# Patient Record
Sex: FEMALE | Race: WHITE | NOT HISPANIC OR LATINO | URBAN - METROPOLITAN AREA
[De-identification: names, ages, dates, MRNs, and addresses within clinical notes are randomized per-mention and may not be internally consistent; named-entity substitution may affect disease eponyms.]

---

## 2020-08-11 ENCOUNTER — LAB (OUTPATIENT)
Dept: LAB | Facility: HOSPITAL | Age: 30
End: 2020-08-11

## 2020-08-11 ENCOUNTER — OFFICE VISIT (OUTPATIENT)
Dept: OBSTETRICS AND GYNECOLOGY | Facility: CLINIC | Age: 30
End: 2020-08-11

## 2020-08-11 VITALS
DIASTOLIC BLOOD PRESSURE: 70 MMHG | HEIGHT: 64 IN | BODY MASS INDEX: 31.07 KG/M2 | WEIGHT: 182 LBS | SYSTOLIC BLOOD PRESSURE: 118 MMHG

## 2020-08-11 DIAGNOSIS — Z01.411 ENCOUNTER FOR GYNECOLOGICAL EXAMINATION WITH ABNORMAL FINDING: ICD-10-CM

## 2020-08-11 DIAGNOSIS — N92.6 IRREGULAR MENSES: ICD-10-CM

## 2020-08-11 DIAGNOSIS — N92.6 IRREGULAR MENSES: Primary | ICD-10-CM

## 2020-08-11 LAB — TSH SERPL DL<=0.05 MIU/L-ACNC: 0.97 UIU/ML (ref 0.27–4.2)

## 2020-08-11 PROCEDURE — 99203 OFFICE O/P NEW LOW 30 MIN: CPT | Performed by: OBSTETRICS & GYNECOLOGY

## 2020-08-11 PROCEDURE — 36415 COLL VENOUS BLD VENIPUNCTURE: CPT | Performed by: OBSTETRICS & GYNECOLOGY

## 2020-08-11 PROCEDURE — 84443 ASSAY THYROID STIM HORMONE: CPT | Performed by: OBSTETRICS & GYNECOLOGY

## 2020-08-11 RX ORDER — MEDROXYPROGESTERONE ACETATE 10 MG/1
10 TABLET ORAL DAILY
Qty: 10 TABLET | Refills: 5 | Status: SHIPPED | OUTPATIENT
Start: 2020-08-11

## 2020-08-11 RX ORDER — BUPRENORPHINE HYDROCHLORIDE AND NALOXONE HYDROCHLORIDE DIHYDRATE 8; 2 MG/1; MG/1
1 TABLET SUBLINGUAL DAILY
COMMUNITY

## 2020-08-11 RX ORDER — QUETIAPINE FUMARATE 50 MG/1
50 TABLET, FILM COATED ORAL NIGHTLY
COMMUNITY

## 2020-08-11 NOTE — PROGRESS NOTES
Subjective   Chief Complaint   Patient presents with   • Menstrual Problem     irregular cycles     Jacquelin Quevedo is a 30 y.o. year old  presenting to be seen for her annual exam.    Current birth control method: Tubal / salpingectomy.  These were also done related to ectopic pregnancies.  Her left tube was removed in  and/or segment same thing happened 2019 with her right tube.  This was done in [place].  She reports that there was no follow-up she had to call them.  Ever since that surgery in 2019 she has had irregular cycles.  I would like to get the operative note to make sure no ovarian cysts or ovaries were removed at either surgery.  She does report appears of always been irregular used to feel heavy about 7 days now only about 3 days.  She thought her Pap smear was done about 2 years ago at the time of surgery but I told her I doubted that she would have had any kind of Pap smear done at the time of the emergency ectopic surgery.  She does report one abnormal Pap smear in the past around age 16 her legs been normal since then.  Has pain with intercourse on deeper penetration may be positional no problems in the missionary position.  Some problems with PMS with a week prior having fatigue depression crying really bui and angry occasions for about a week and then it resolves.  Discussed potential dietary changes etc.  She is already on Seroquel and Suboxone.    Patient's last menstrual period was 2020.    She is sexually active.   Condoms are not typically used.    [place] is painful or she is having problems :yes   She has concerns about domestic violence: no.    Cycle Frequency: irregular   Menstrual cycle character: flow is typically normal   Cycle Duration: 3 - 7   Number of heavy days of flows: 2   Intermenstrual bleeding present: no   Post-coital bleeding present: no     She exercises regularly: no.  Self breast awareness:no    Calcium intake: is not adequate.  Caffeine intake:  "caffeine use is moderate-to-high daily  Social History    Tobacco Use      Smoking status: Current Every Day Smoker        Packs/day: 1.00      Smokeless tobacco: Never Used    Social History     Substance and Sexual Activity   Alcohol Use Not Currently        The following portions of the patient's history were reviewed and updated as is  appropriate:She  has a past medical history of Anxiety and Depression.  She does not have a problem list on file.  She  has a past surgical history that includes [place] tooth extraction (2007); Salpingectomy (Left, 2014); and Ectopic pregnancy (Right, 2019).  Her family history is not on file.  She  reports that she has been smoking. She has been smoking about 1.00 pack per day. She has never used smokeless tobacco. She reports that she drank alcohol. She reports that she has current or past drug history.  She is allergic to amoxicillin and penicillins..    Current Outpatient Medications:   •  buprenorphine-naloxone (SUBOXONE) 8-2 MG per SL tablet, Place 1 tablet under the tongue Daily., Disp: , Rfl:   •  QUEtiapine (SEROquel) 50 MG tablet, Take 50 mg by mouth Every Night., Disp: , Rfl:   •  medroxyPROGESTERone (Provera) 10 MG tablet, Take 1 tablet by mouth Daily. Use every 60 days no menses, Disp: 10 tablet, Rfl: 5    Review of systems  Constitutional    POS Skin rashes                            NEG anorexia, fevers, night sweats or weight gain  Breast                POS nothing reported                            NEG persistent breast lump, skin dimpling or nipple discharge  GI                      POS nothing reported                            NEG bloating, change in bowel habits, melena or reflux symptoms                       POS nothing reported                            NEG dysuria or hematuria         Objective   /70   Ht 162.6 cm (64\")   Wt 82.6 kg (182 lb)   LMP 06/16/2020 Comment: B&S  Breastfeeding No   BMI 31.24 kg/m²       General:  well developed; " well nourished  no acute distress  appears stated age   Skin:  No suspicious lesions seen  Lesions on abdomen and lower extremities consistent with insect bites or allergic type of reaction   Thyroid: normal   Breasts:  Not performed.   Abdomen: soft, non-tender; no masses  no umbilical or inguinal hernias are present  no hepato-splenomegaly   Pelvis: Clinical staff was present for exam  External genitalia:  normal appearance of the external genitalia including Bartholin's and [place]'s glands.  :  urethral meatus normal;  Vaginal:  normal pink mucosa without prolapse or lesions.  Cervix:  normal appearance. Pap obtained  Uterus:  normal size, shape and consistency. anteverted;  Adnexa:  normal bimanual exam of the adnexa.  Rectal:  digital rectal exam not performed; anus visually normal appearing.       Lab Review   No data reviewed    Imaging  No data reviewed       Assessment     1. Irregular menses  2. History of 3 ectopic pregnancies bilateral salpingectomy is no follow-up I like to find out if ovarian cyst was removed  3. History of PMS type irritability.  Discussed dietary changes and exercise  4. 1 pack/day smoker encouraged to quit  5. Suboxone maintenance             Plan     1. Check TSH  2. Release of information regarding ectopics  3. 1000 mg calcium in divided doses ideally in diet; regular exercise  4. Self breast awareness discussed  5. Chart cycles Provera 10 mg x 10 days every 60 days as needed  6. Will need to send information regarding PMS and dietary changes (done on Wednesday, August 12)            New Medications Ordered This Visit   Medications   • medroxyPROGESTERone (Provera) 10 MG tablet     Sig: Take 1 tablet by mouth Daily. Use every 60 days no menses     Dispense:  10 tablet     Refill:  5     No orders of the defined types were placed in this encounter.          This note was electronically signed.    Fred Gomez MD  8/11/2020

## 2020-08-12 ENCOUNTER — TELEPHONE (OUTPATIENT)
Dept: OBSTETRICS AND GYNECOLOGY | Facility: CLINIC | Age: 30
End: 2020-08-12

## 2020-08-12 NOTE — TELEPHONE ENCOUNTER
Patient called and stated that she was spotting and wasn't sure if she should start the Provera or not.  I advised her that after speaking with Dr. Gomez that he would like for her to take it for 10 days and see what it does.  Patient verbalized understanding and had no questions at this time.

## 2024-01-26 ENCOUNTER — HOSPITAL ENCOUNTER (EMERGENCY)
Facility: HOSPITAL | Age: 34
Discharge: HOME | End: 2024-01-26
Attending: EMERGENCY MEDICINE
Payer: MEDICAID

## 2024-01-26 ENCOUNTER — APPOINTMENT (OUTPATIENT)
Dept: GENERAL RADIOLOGY | Facility: HOSPITAL | Age: 34
End: 2024-01-26
Payer: MEDICAID

## 2024-01-26 VITALS
SYSTOLIC BLOOD PRESSURE: 132 MMHG | DIASTOLIC BLOOD PRESSURE: 92 MMHG | WEIGHT: 185 LBS | RESPIRATION RATE: 17 BRPM | BODY MASS INDEX: 30.82 KG/M2 | OXYGEN SATURATION: 97 % | HEART RATE: 99 BPM | TEMPERATURE: 98.4 F | HEIGHT: 65 IN

## 2024-01-26 DIAGNOSIS — F17.200 SMOKER: ICD-10-CM

## 2024-01-26 DIAGNOSIS — J20.9 ACUTE BRONCHITIS, UNSPECIFIED ORGANISM: Primary | ICD-10-CM

## 2024-01-26 PROCEDURE — 71046 X-RAY EXAM CHEST 2 VIEWS: CPT

## 2024-01-26 PROCEDURE — 99282 EMERGENCY DEPT VISIT SF MDM: CPT

## 2024-01-26 RX ORDER — ALBUTEROL SULFATE 2.5 MG/3ML
2.5 SOLUTION RESPIRATORY (INHALATION) EVERY 4 HOURS PRN
Qty: 100 ML | Refills: 0 | Status: SHIPPED | OUTPATIENT
Start: 2024-01-26

## 2024-01-26 RX ORDER — DOXYCYCLINE 100 MG/1
100 CAPSULE ORAL 2 TIMES DAILY
Qty: 14 CAPSULE | Refills: 0 | Status: SHIPPED | OUTPATIENT
Start: 2024-01-26 | End: 2024-02-02

## 2024-01-26 RX ORDER — PREDNISONE 50 MG/1
50 TABLET ORAL DAILY
Qty: 5 TABLET | Refills: 0 | Status: SHIPPED | OUTPATIENT
Start: 2024-01-26

## 2024-01-26 NOTE — ED PROVIDER NOTES
[place]    EMERGENCY DEPARTMENT ENCOUNTER      Pt Name: Jacquelin Quevedo  MRN: [id number]  YOB: 1990  Date of evaluation: 1/26/2024  Provider: Suleman Portillo MD    CHIEF COMPLAINT       Chief Complaint   Patient presents with    Shortness of Breath         HISTORY OF PRESENT ILLNESS   Jacquelin Quevedo is a 33 y.o. female who presents to the emergency department    With complaint of persistent cough over the course the past month and a half.  Patient states that she was previously told she had a viral upper respiratory infection.  She denies any associated chest pain, fever, or chills.  She has no prior history of PE as well as no recent surgery, hospitalization, long distance travel, or swelling/pain in the legs.  She is a smoker.    Nursing notes were reviewed.    REVIEW OF SYSTEMS     ROS:  A chief complaint appropriate review of systems was completed and is negative except as noted in the HPI.      PAST MEDICAL HISTORY     Past Medical History:   Diagnosis Date    Anxiety     Depression          SURGICAL HISTORY       Past Surgical History:   Procedure Laterality Date    ECTOPIC PREGNANCY Right 2019    Laparoscopic salpingectomy    LAPAROSCOPY FOR ECTOPIC PREGNANCY Left 2014    Salpingectomy?  Partial versus total    WISDOM TOOTH EXTRACTION  2007         CURRENT MEDICATIONS     No current facility-administered medications for this encounter.    Current Outpatient Medications:     albuterol (PROVENTIL) (2.5 MG/3ML) 0.083% nebulizer solution, Take 2.5 mg by nebulization Every 4 (Four) Hours As Needed for Wheezing., Disp: 100 mL, Rfl: 0    buprenorphine-naloxone (SUBOXONE) 8-2 MG per SL tablet, Place 1 tablet under the tongue Daily., Disp: , Rfl:     doxycycline (MONODOX) 100 MG capsule, Take 1 capsule by mouth 2 (Two) Times a Day for 7 days., Disp: 14 capsule, Rfl: 0    medroxyPROGESTERone (Provera) 10 MG tablet, Take 1 tablet by mouth Daily. Use every 60 days no menses, Disp: 10 tablet, Rfl: 5     "predniSONE (DELTASONE) 50 MG tablet, Take 1 tablet by mouth Daily., Disp: 5 tablet, Rfl: 0    QUEtiapine (SEROquel) 50 MG tablet, Take 50 mg by mouth Every Night., Disp: , Rfl:     ALLERGIES     Amoxicillin and Penicillins    FAMILY HISTORY     No family history on file.       SOCIAL HISTORY       Social History     Socioeconomic History    Marital status:    Tobacco Use    Smoking status: Every Day     Packs/day: 1     Types: Cigarettes    Smokeless tobacco: Never   Substance and Sexual Activity    Alcohol use: Not Currently    Drug use: Yes     Comment: recovery from opiads    Sexual activity: Yes     Partners: Male         PHYSICAL EXAM    (up to 7 for level 4, 8 or more for level 5)     Vitals:    01/26/24 1549   BP: 132/92   BP Location: Right arm   Patient Position: Sitting   Pulse: 99   Resp: 17   Temp: 98.4 °F (36.9 °C)   TempSrc: Oral   SpO2: 97%   Weight: 83.9 kg (185 lb)   Height: 165.1 cm (65\")     General: Awake, alert, no acute distress.  HEENT: Conjunctivae normal.  Neck: Trachea midline.  Cardiac: Heart regular rate, rhythm, no murmurs, rubs, or gallops  Lungs: Moderate diffuse expiratory wheezes.  No focal finding.  Chest wall: There is no tenderness to palpation over the chest wall or over ribs  Abdomen: Abdomen is soft, nontender, nondistended. There are no firm or pulsatile masses, no rebound rigidity or guarding.   Musculoskeletal: No deformity.  Neuro: Alert and oriented x 4.  Dermatology: Skin is warm and dry  Psych: Mentation is grossly normal, cognition is grossly normal. Affect is appropriate.          DIAGNOSTIC RESULTS     EKG: All EKGs are interpreted by the Emergency Department Physician who either signs or Co-signs this chart in the absence of a cardiologist.    No orders to display         RADIOLOGY:   [x] Radiologist's Report Reviewed:  XR Chest 2 View   Final Result   Impression:   Moderate diffuse peribronchial thickening, suggesting bronchitis. Chronic changes of " bronchiectasis are considered less likely. Given the extent of disease, consider follow-up radiograph after treatment.         Electronically Signed: Ronald Holland MD     1/26/2024 4:12 PM EST     Workstation ID: [id number]          I ordered and independently reviewed the above noted radiographic studies.        LABS:    I have reviewed and interpreted all of the currently available lab results from this visit (if applicable):  Results for orders placed or performed in visit on 08/11/20   TSH    Specimen: Blood   Result Value Ref Range    TSH 0.969 0.270 - 4.200 uIU/mL        If labs were ordered, I independently reviewed the results and considered them in treating the patient.      EMERGENCY DEPARTMENT COURSE and DIFFERENTIAL DIAGNOSIS/MDM:   Vitals:  AS OF 23:27 EST    BP - 132/92  HR - 99  TEMP - 98.4 °F (36.9 °C) (Oral)  O2 SATS - 97%        Discussion below represents my analysis of pertinent findings related to patient's condition, differential diagnosis, treatment plan and final disposition.      Differential diagnosis:  The differential diagnosis associated with the patient's presentation includes: Acute bronchitis, COPD, asthma, pneumonia, pneumothorax      Independent interpretations (ECG/rhythm strip/X-ray/US/CT scan): I independently interpreted the patient's chest x-ray and see no evidence of lobar infiltrate.      Patient's care impacted by:   [] Diabetes   [] Hypertension   [] Coronary Artery Disease   [] Cancer   [x] Other: Smoker    Care significantly affected by Social Determinants of Health (housing and economic circumstances, unemployment)    [] Yes     [x] No   If yes, Patient's care significantly limited by  Social Determinants of Health including:    [] Inadequate housing    [] Low income    [] Alcoholism and drug addiction in family    [] Problems related to primary support group    [] Unemployment    [] Problems related to employment    [] Other Social Determinants of Health:       I considered  prescription management with:    [] Pain medication:   [] Antiviral:   [x] Antibiotic: Patient prescribed doxycycline and prednisone   [] Other:    ED Course:    ED Course as of 01/26/24 2327 Fri Jan 26, 2024   1641 Patient very well-appearing and nontoxic on reexamination.  She is not hypoxic.  She is low risk for PE and negative by the PERC score, clinically ruling out pulmonary embolism.  There is no lobar infiltrate on chest x-ray.  Concern for underlying COPD/bronchiectasis with acute bronchitis.  Will place the patient on oral antibiotics and p.o. steroids.  She does not have a primary care provider and so we will place her in the inpatient conduction system to facilitate follow-up. [NS]      ED Course User Index  [NS] Suleman Portillo MD             I had a discussion with the patient/family regarding diagnosis, diagnostic results, treatment plan, and medications.  The patient/family indicated understanding of these instructions.  I spent adequate time at the bedside preceding discharge necessary to personally discuss the aftercare instructions, giving patient education, providing explanations of the results of our evaluations/findings, and my decision making to assure that the patient/family understand the plan of care.  Time was allotted to answer questions at that time and throughout the ED course.  Emphasis was placed on timely follow-up after discharge.  I also discussed the potential for the development of an acute emergent condition requiring further evaluation, admission, or even surgical intervention. I discussed that we found nothing during the visit today indicating the need for further workup, admission, or the presence of an unstable medical condition.  I encouraged the patient to return to the emergency department immediately for ANY concerns, worsening, new complaints, or if symptoms persist and unable to seek follow-up in a timely fashion.  The patient/family expressed understanding and  agreement with this plan.  The patient will follow-up with their PCP in 1-2 days for reevaluation.           PROCEDURES:  Procedures    CRITICAL CARE TIME        FINAL IMPRESSION      1. Acute bronchitis, unspecified organism    2. Smoker          DISPOSITION/PLAN     ED Disposition       ED Disposition   Discharge    Condition   Stable    Comment   --                 Comment: Please note this report has been produced using speech recognition software.      Suleman Portillo MD  Attending Emergency Physician             Suleman Portillo MD  01/26/24 4268

## 2024-03-12 ENCOUNTER — APPOINTMENT (OUTPATIENT)
Dept: CT IMAGING | Facility: HOSPITAL | Age: 34
End: 2024-03-12
Payer: MEDICAID

## 2024-03-12 ENCOUNTER — HOSPITAL ENCOUNTER (EMERGENCY)
Facility: HOSPITAL | Age: 34
Discharge: HOME | End: 2024-03-12
Attending: EMERGENCY MEDICINE | Admitting: EMERGENCY MEDICINE
Payer: MEDICAID

## 2024-03-12 VITALS
HEIGHT: 65 IN | HEART RATE: 87 BPM | SYSTOLIC BLOOD PRESSURE: 158 MMHG | RESPIRATION RATE: 18 BRPM | BODY MASS INDEX: 29.99 KG/M2 | DIASTOLIC BLOOD PRESSURE: 97 MMHG | WEIGHT: 180 LBS | TEMPERATURE: 97.5 F | OXYGEN SATURATION: 96 %

## 2024-03-12 DIAGNOSIS — Z87.891 SMOKING HISTORY: ICD-10-CM

## 2024-03-12 DIAGNOSIS — J40 BRONCHITIS: Primary | ICD-10-CM

## 2024-03-12 PROCEDURE — 63710000001 DEXAMETHASONE PER 0.25 MG: Performed by: EMERGENCY MEDICINE

## 2024-03-12 PROCEDURE — 71250 CT THORAX DX C-: CPT

## 2024-03-12 PROCEDURE — 94640 AIRWAY INHALATION TREATMENT: CPT

## 2024-03-12 PROCEDURE — 94799 UNLISTED PULMONARY SVC/PX: CPT

## 2024-03-12 PROCEDURE — 99284 EMERGENCY DEPT VISIT MOD MDM: CPT

## 2024-03-12 RX ORDER — METHYLPREDNISOLONE 4 MG/1
TABLET ORAL
Qty: 21 TABLET | Refills: 0 | Status: SHIPPED | OUTPATIENT
Start: 2024-03-12

## 2024-03-12 RX ORDER — IPRATROPIUM BROMIDE AND ALBUTEROL SULFATE 2.5; .5 MG/3ML; MG/3ML
3 SOLUTION RESPIRATORY (INHALATION) ONCE
Status: COMPLETED | OUTPATIENT
Start: 2024-03-12 | End: 2024-03-12

## 2024-03-12 RX ADMIN — IPRATROPIUM BROMIDE AND ALBUTEROL SULFATE 3 ML: 2.5; .5 SOLUTION RESPIRATORY (INHALATION) at 05:09

## 2024-03-12 RX ADMIN — DEXAMETHASONE 6 MG: 2 TABLET ORAL at 05:16

## 2024-03-12 NOTE — ED PROVIDER NOTES
Subjective   History of Present Illness  Is a 34-year-old female with past medical history of bronchitis presented to the emergency department with some cough.  Patient states that she was diagnosed with bronchitis few months ago.  She took some steroids and inhalers and her symptoms improved.  Patient states that over the last week or so her symptoms have worsened.  She has had a mild cough.  Nonproductive in nature.  When she has coughing spell she has some left lateral rib pain.  Patient continues to smoke as well.  She denies any fevers or chills.  No headache or change in vision.  No focal weakness.  No abdominal pain or vomiting.    History provided by:  Patient   used: No        Review of Systems   Constitutional:  Negative for chills and fever.   HENT:  Negative for congestion, ear pain and sore throat.    Eyes:  Negative for visual disturbance.   Respiratory:  Positive for cough, shortness of breath and wheezing.    Cardiovascular:  Negative for chest pain.   Gastrointestinal:  Negative for abdominal pain.   Genitourinary:  Negative for difficulty urinating.   Musculoskeletal:  Negative for arthralgias.   Skin:  Negative for rash.   Neurological:  Negative for dizziness, weakness and numbness.   Psychiatric/Behavioral:  Negative for agitation.        Past Medical History:   Diagnosis Date    Anxiety     Depression        Allergies   Allergen Reactions    Amoxicillin Other (See Comments)     child    Penicillins Other (See Comments)     child       Past Surgical History:   Procedure Laterality Date    ECTOPIC PREGNANCY Right 2019    Laparoscopic salpingectomy    LAPAROSCOPY FOR ECTOPIC PREGNANCY Left 2014    Salpingectomy?  Partial versus total    WISDOM TOOTH EXTRACTION  2007       No family history on file.    Social History     Socioeconomic History    Marital status:    Tobacco Use    Smoking status: Every Day     Current packs/day: 1.00     Types: Cigarettes    Smokeless  tobacco: Never   Substance and Sexual Activity    Alcohol use: Not Currently    Drug use: Yes     Comment: recovery from opiads    Sexual activity: Yes     Partners: Male           Objective   Physical Exam  Vitals and nursing note reviewed.   Constitutional:       General: She is not in acute distress.     Appearance: She is not ill-appearing or toxic-appearing.   HENT:      Mouth/Throat:      Pharynx: No posterior oropharyngeal erythema.   Eyes:      Conjunctiva/sclera: Conjunctivae normal.      Pupils: Pupils are equal, round, and reactive to light.   Cardiovascular:      Rate and Rhythm: Normal rate and regular rhythm.   Pulmonary:      Effort: Pulmonary effort is normal. No respiratory distress.      Breath sounds: Wheezing present.      Comments: Patient is some tenderness to palpation of the left lateral ribs.  Normal chest wall rise and fall  Abdominal:      General: Abdomen is flat. There is no distension.      Palpations: There is no mass.      Tenderness: There is no abdominal tenderness. There is no guarding or rebound.   Musculoskeletal:         General: No deformity. Normal range of motion.   Skin:     General: Skin is warm.      Findings: No rash.   Neurological:      General: No focal deficit present.      Mental Status: She is alert and oriented to person, place, and time.      Motor: No weakness.         Procedures           ED Course  ED Course as of 03/12/24 0559   Tu Mar 12, 2024   0557 BP: 158/97 [JK]   0557 Temp: 97.5 °F (36.4 °C) [JK]   0557 Temp src: Oral [JK]   0557 Heart Rate: 88 [JK]   0557 Resp: 18 [JK]   0557 SpO2: 99 %  Interpretation:  Patient's repeat vitals, telemetry tracing, and pulse oximetry tracing were directly viewed and interpreted by myself.  Normal sinus rhythm [JK]   0557 CT Chest Without Contrast Diagnostic [JK]   0557 Interpretation:  Imaging was directly visualized by myself, per my interpretations, CT of the chest was unremarkable. [JK]   0557 On reevaluation,  patient is feeling better.  Wheezing is improved.  Is no respiratory distress.  Patient's findings are consistent with bronchitis.  She will be discharged on a short course of steroids.  She states that she already has an inhaler at home.  We did have a long discussion regarding smoking cessation.  Patient will follow-up with PCP in 48 hours.  Given strict return precautions.  Verbalized understanding. [JK]   8884 I had a discussion with the patient/family regarding diagnosis, diagnostic results, treatment plan, and medications. The patient/family indicated understanding of these instructions. I spent adequate time at the bedside prior to discharge necessary to discuss the aftercare instructions, giving patient education, providing explanations of the results of our evaluations/findings, and my decision making to assure that the patient/family understand the plan of care. Time was allotted to answer questions at that time and throughout the ED course. Patient is required to maintain timely follow up, as discussed. I also discussed the potential for the development of an acute emergent condition requiring further evaluation, return to the ER, admission, or even surgical intervention.  I encouraged the patient to return to the emergency department immediately for any concerns, worsening symptoms, new complaints, or if symptoms persist and they are unable to seek follow-up in a timely fashion. The patient/family expressed understanding and agreement with this plan    Shared decision making:   After full review of the patient's clinical presentation, review of any work-up including but not limited to laboratory studies and radiology obtained, I had a discussion with the patient.  Treatment options were discussed as well as the risks, benefits and consequences.  I discussed all findings with the patient and family members if available.  During the discussion, treatment goals were understood by all as well as any  misconceptions which were addressed with the patient.  Ample time was given for any questions they may have had.  They are in agreement with the treatment plan as well as final disposition. [JK]      ED Course User Index  [JK] Nico Neely MD                                             Medical Decision Making  This is a 34-year-old female presenting with some cough and rib pain.  The patient's symptoms are consistent with acute bronchitis.  The patient has no evidence of respiratory distress or hypoxia at this time.  Patient does continue to smoke, and I do believe this is exacerbating her symptoms.  Patient does have some mild wheezing on examination.  We did provide DuoNeb therapy as well as oral steroids.  Patient placed on continuous telemetry and pulse oximeter monitoring.  Workup initiated.      Differential diagnosis: Bronchitis, pneumonia, URI, rib contusion, rib fracture      Amount and/or Complexity of Data Reviewed  Radiology: ordered and independent interpretation performed. Decision-making details documented in ED Course.    Risk  Prescription drug management.        Final diagnoses:   Bronchitis   Smoking history       ED Disposition  ED Disposition       ED Disposition   Discharge    Condition   Stable    Comment   --               PATIENT CONNECTION - [place] [address]  [phone]  Call in 1 day           Medication List        New Prescriptions      methylPREDNISolone 4 MG dose pack  Commonly known as: MEDROL  Take as directed on package instructions.            Stop      predniSONE 50 MG tablet  Commonly known as: DELTASONE               Where to Get Your Medications        These medications were sent to [facility]/pharmacy #[id number] - [place], [place] - [address] [place] [address] - [phone] [facility] [phone] [address] [address]      Phone: [phone]   methylPREDNISolone 4 MG dose pack            Nico Neely MD  03/12/24  4541